# Patient Record
Sex: FEMALE | Race: WHITE | Employment: OTHER | URBAN - METROPOLITAN AREA
[De-identification: names, ages, dates, MRNs, and addresses within clinical notes are randomized per-mention and may not be internally consistent; named-entity substitution may affect disease eponyms.]

---

## 2017-03-27 PROBLEM — Z47.89 AFTERCARE FOLLOWING SURGERY OF THE MUSCULOSKELETAL SYSTEM: Status: ACTIVE | Noted: 2017-03-27

## 2020-11-24 ENCOUNTER — HOSPITAL ENCOUNTER (OUTPATIENT)
Age: 45
Discharge: HOME | End: 2020-11-24
Payer: COMMERCIAL

## 2020-11-24 VITALS
BODY MASS INDEX: 26 KG/M2 | HEART RATE: 81 BPM | TEMPERATURE: 99 F | RESPIRATION RATE: 16 BRPM | SYSTOLIC BLOOD PRESSURE: 121 MMHG | WEIGHT: 165 LBS | OXYGEN SATURATION: 96 % | DIASTOLIC BLOOD PRESSURE: 72 MMHG

## 2020-11-24 DIAGNOSIS — N30.00 ACUTE CYSTITIS WITHOUT HEMATURIA: Primary | ICD-10-CM

## 2020-11-24 PROCEDURE — 81025 URINE PREGNANCY TEST: CPT | Performed by: NURSE PRACTITIONER

## 2020-11-24 PROCEDURE — 81002 URINALYSIS NONAUTO W/O SCOPE: CPT | Performed by: NURSE PRACTITIONER

## 2020-11-24 PROCEDURE — 99203 OFFICE O/P NEW LOW 30 MIN: CPT | Performed by: NURSE PRACTITIONER

## 2020-11-24 RX ORDER — SULFAMETHOXAZOLE AND TRIMETHOPRIM 800; 160 MG/1; MG/1
1 TABLET ORAL 2 TIMES DAILY
Qty: 14 TABLET | Refills: 0 | Status: SHIPPED | OUTPATIENT
Start: 2020-11-24 | End: 2020-12-01

## 2020-11-24 NOTE — ED PROVIDER NOTES
Patient Seen in: Immediate [address] [place] [address]      History   Patient presents with:  Urinary Symptoms    Stated Complaint: uti    73-year-old female presents today with complaints of urinary frequency urgency and dysuria. Symptoms started yesterday.   Denies an Temporal   SpO2 96 %   O2 Device None (Room air)       Current:/72   Pulse 81   Temp 98.6 °F (37 °C) (Temporal)   Resp 16   Wt 74.8 kg   LMP 12/18/2019   SpO2 96%   BMI 25.84 kg/m²         Physical Exam    Physical Exam  Vitals signs and nursing note (primary encounter diagnosis)    Disposition:  Discharge  11/24/2020  1:44 pm    Follow-up:  Miesha Fish, [id number]    In 1 week  As needed          Medications Prescribed:  Current Disch

## 2020-11-27 NOTE — ED NOTES
Spoke with Pt and informed her of negative urine culture, Pt continues to be symptomatic and will have an appt with PCP.

## 2021-01-23 PROBLEM — M25.559 HIP PAIN: Status: ACTIVE | Noted: 2021-01-23
